# Patient Record
Sex: FEMALE | Race: WHITE | HISPANIC OR LATINO | Employment: UNEMPLOYED | URBAN - METROPOLITAN AREA
[De-identification: names, ages, dates, MRNs, and addresses within clinical notes are randomized per-mention and may not be internally consistent; named-entity substitution may affect disease eponyms.]

---

## 2022-05-27 ENCOUNTER — OFFICE VISIT (OUTPATIENT)
Dept: OBSTETRICS AND GYNECOLOGY | Facility: CLINIC | Age: 22
End: 2022-05-27
Payer: MEDICAID

## 2022-05-27 VITALS
WEIGHT: 114 LBS | BODY MASS INDEX: 21.52 KG/M2 | SYSTOLIC BLOOD PRESSURE: 122 MMHG | OXYGEN SATURATION: 100 % | HEIGHT: 61 IN | RESPIRATION RATE: 18 BRPM | DIASTOLIC BLOOD PRESSURE: 68 MMHG | HEART RATE: 91 BPM

## 2022-05-27 DIAGNOSIS — N91.2 AMENORRHEA: ICD-10-CM

## 2022-05-27 DIAGNOSIS — O21.9 NAUSEA AND VOMITING DURING PREGNANCY: ICD-10-CM

## 2022-05-27 DIAGNOSIS — Z32.01 POSITIVE PREGNANCY TEST: Primary | ICD-10-CM

## 2022-05-27 LAB
B-HCG UR QL: POSITIVE
CTP QC/QA: YES

## 2022-05-27 PROCEDURE — 99203 OFFICE O/P NEW LOW 30 MIN: CPT | Mod: 25,S$GLB,, | Performed by: ADVANCED PRACTICE MIDWIFE

## 2022-05-27 PROCEDURE — 81025 URINE PREGNANCY TEST: CPT | Mod: S$GLB,,, | Performed by: ADVANCED PRACTICE MIDWIFE

## 2022-05-27 PROCEDURE — 99203 PR OFFICE/OUTPT VISIT, NEW, LEVL III, 30-44 MIN: ICD-10-PCS | Mod: 25,S$GLB,, | Performed by: ADVANCED PRACTICE MIDWIFE

## 2022-05-27 PROCEDURE — 81025 POCT URINE PREGNANCY: ICD-10-PCS | Mod: S$GLB,,, | Performed by: ADVANCED PRACTICE MIDWIFE

## 2022-05-27 RX ORDER — ONDANSETRON 4 MG/1
4 TABLET, FILM COATED ORAL EVERY 6 HOURS PRN
Qty: 30 TABLET | Refills: 1 | Status: SHIPPED | OUTPATIENT
Start: 2022-05-27

## 2022-05-27 RX ORDER — PROMETHAZINE HYDROCHLORIDE 25 MG/1
25 TABLET ORAL EVERY 8 HOURS PRN
Qty: 30 TABLET | Refills: 1 | Status: SHIPPED | OUTPATIENT
Start: 2022-05-27

## 2022-05-27 NOTE — PROGRESS NOTES
"CC: Absence of menses    iSlas Kan is a 21 y.o. female  presents with complaint of absence of menstruation.   LMP = very unsure LMP possibly 2022= EDC of 22. ? 10 weeks pregnant.   UPT is positive. No lof/brvb, dysuria, fever/chills or abdominal pain. Moderate N&V not controlled with comfort measures. Pleasant, well groomed, NAD. ROS negative with exception of aforementioned:    History  OBHX:    N&V  PMXH:  Denies major illness/injury  NKDA  MEDS:  OTC PNV  SURGHX:  Denies  SOCHX:  Former smoker, quit > 1 year ago.  Denies etoh or substance use in pregnancy.  FOB: Benjy Sinha involved in pregnancy.    History reviewed. No pertinent past medical history.  History reviewed. No pertinent surgical history.  Social History     Socioeconomic History    Marital status: Single   Tobacco Use    Smoking status: Never Smoker    Smokeless tobacco: Never Used   Substance and Sexual Activity    Alcohol use: Not Currently    Drug use: Never    Sexual activity: Yes     History reviewed. No pertinent family history.  OB History    Para Term  AB Living   1             SAB IAB Ectopic Multiple Live Births                  # Outcome Date GA Lbr Luis Fernando/2nd Weight Sex Delivery Anes PTL Lv   1 Current                /68   Pulse 91   Resp 18   Ht 5' 1" (1.549 m)   Wt 51.7 kg (114 lb)   SpO2 100%   BMI 21.54 kg/m²     ROS:  GENERAL: Denies weight gain or weight loss. Feeling well overall.   SKIN: Denies rash or lesions.   HEAD: Denies head injury or headache.   NODES: Denies enlarged lymph nodes.   CHEST: Denies chest pain or shortness of breath.   CARDIOVASCULAR: Denies palpitations or left sided chest pain.   ABDOMEN: No abdominal pain, constipation, diarrhea, or rectal bleeding. Moderate N&V  URINARY: No frequency, dysuria, hematuria, or burning on urination.  REPRODUCTIVE: See HPI.   BREASTS: The patient performs breast self-examination and denies pain, lumps, or nipple " discharge.   HEMATOLOGIC: No easy bruisability or excessive bleeding.   MUSCULOSKELETAL: Denies joint pain or swelling.   NEUROLOGIC: Denies syncope or weakness.   PSYCHIATRIC: Denies depression, anxiety or mood swings.    PE:   APPEARANCE: Well nourished, well groomed, well developed, in no acute distress.  AFFECT: WNL, alert and oriented x 3.  SKIN: No acne or hirsutism. No lesion/rashes.  NEURO: Gait is smooth and steady.  EXTREMITIES: No edema.    ASSESSMENT and PLAN:    ICD-10-CM ICD-9-CM    1. Positive pregnancy test  Z32.01 V72.42 US OB/GYN Procedure ([facility])   2. Nausea and vomiting during pregnancy  O21.9 643.90    3. Amenorrhea  N91.2 626.0 POCT urine pregnancy       [place] to our collaborative ob/gyn practice and CNM care.  S&S of SAB reinforced.  Patient was counseled today on proper weight gain.  Discussed foods to avoid in pregnancy (i.e. sushi, fish that are high in mercury, deli meat, and unpasteurized cheeses).   RX for Zofran and Phenergan discussed and sent to pharmacy. Additional comfort measures discussed.  US for dating discussed and ordered for next week.  CHEMO 2 weeks with me. Likely new ob labs at next visit.

## 2022-06-03 ENCOUNTER — HOSPITAL ENCOUNTER (OUTPATIENT)
Dept: MATERNAL FETAL MEDICINE | Facility: CLINIC | Age: 22
Discharge: HOME | End: 2022-06-03
Payer: MEDICAID

## 2022-06-03 DIAGNOSIS — Z32.01 POSITIVE PREGNANCY TEST: ICD-10-CM

## 2022-06-03 PROCEDURE — 76801 US OB/GYN PROCEDURE (VIEWPOINT): ICD-10-PCS | Mod: S$GLB,,, | Performed by: OBSTETRICS & GYNECOLOGY

## 2022-06-03 PROCEDURE — 76801 OB US < 14 WKS SINGLE FETUS: CPT | Mod: S$GLB,,, | Performed by: OBSTETRICS & GYNECOLOGY

## 2022-06-10 ENCOUNTER — LAB VISIT (OUTPATIENT)
Dept: LAB | Facility: CLINIC | Age: 22
End: 2022-06-10
Payer: MEDICAID

## 2022-06-10 DIAGNOSIS — Z3A.09 9 WEEKS GESTATION OF PREGNANCY: ICD-10-CM

## 2022-06-10 LAB
ABO + RH BLD: NORMAL
BASOPHILS # BLD AUTO: 0.02 K/UL (ref 0–0.2)
BASOPHILS NFR BLD: 0.3 % (ref 0–1.9)
BLD GP AB SCN CELLS X3 SERPL QL: NORMAL
DIFFERENTIAL METHOD: NORMAL
EOSINOPHIL # BLD AUTO: 0 K/UL (ref 0–0.5)
EOSINOPHIL NFR BLD: 0.5 % (ref 0–8)
ERYTHROCYTE [DISTWIDTH] IN BLOOD BY AUTOMATED COUNT: 12.5 % (ref 11.5–14.5)
HCT VFR BLD AUTO: 38.2 % (ref 37–48.5)
HGB BLD-MCNC: 13.4 G/DL (ref 12–16)
IMM GRANULOCYTES # BLD AUTO: 0.02 K/UL (ref 0–0.04)
IMM GRANULOCYTES NFR BLD AUTO: 0.3 % (ref 0–0.5)
LYMPHOCYTES # BLD AUTO: 1.8 K/UL (ref 1–4.8)
LYMPHOCYTES NFR BLD: 23.6 % (ref 18–48)
MCH RBC QN AUTO: 29.7 PG (ref 27–31)
MCHC RBC AUTO-ENTMCNC: 35.1 G/DL (ref 32–36)
MCV RBC AUTO: 85 FL (ref 82–98)
MONOCYTES # BLD AUTO: 0.3 K/UL (ref 0.3–1)
MONOCYTES NFR BLD: 4.3 % (ref 4–15)
NEUTROPHILS # BLD AUTO: 5.4 K/UL (ref 1.8–7.7)
NEUTROPHILS NFR BLD: 71 % (ref 38–73)
NRBC BLD-RTO: 0 /100 WBC
PLATELET # BLD AUTO: 225 K/UL (ref 150–450)
PMV BLD AUTO: 10 FL (ref 9.2–12.9)
RBC # BLD AUTO: 4.51 M/UL (ref 4–5.4)
WBC # BLD AUTO: 7.6 K/UL (ref 3.9–12.7)

## 2022-06-10 PROCEDURE — 36415 COLL VENOUS BLD VENIPUNCTURE: CPT | Mod: ,,, | Performed by: ADVANCED PRACTICE MIDWIFE

## 2022-06-10 PROCEDURE — 86901 BLOOD TYPING SEROLOGIC RH(D): CPT | Performed by: ADVANCED PRACTICE MIDWIFE

## 2022-06-10 PROCEDURE — 86762 RUBELLA ANTIBODY: CPT | Performed by: ADVANCED PRACTICE MIDWIFE

## 2022-06-10 PROCEDURE — 86592 SYPHILIS TEST NON-TREP QUAL: CPT | Performed by: ADVANCED PRACTICE MIDWIFE

## 2022-06-10 PROCEDURE — 87389 HIV-1 AG W/HIV-1&-2 AB AG IA: CPT | Performed by: ADVANCED PRACTICE MIDWIFE

## 2022-06-10 PROCEDURE — 36415 PR COLLECTION VENOUS BLOOD,VENIPUNCTURE: ICD-10-PCS | Mod: ,,, | Performed by: ADVANCED PRACTICE MIDWIFE

## 2022-06-10 PROCEDURE — 85025 COMPLETE CBC W/AUTO DIFF WBC: CPT | Performed by: ADVANCED PRACTICE MIDWIFE

## 2022-06-10 PROCEDURE — 85660 RBC SICKLE CELL TEST: CPT | Performed by: ADVANCED PRACTICE MIDWIFE

## 2022-06-10 PROCEDURE — 80074 ACUTE HEPATITIS PANEL: CPT | Performed by: ADVANCED PRACTICE MIDWIFE

## 2022-06-11 LAB
HGB S BLD QL SOLY: NEGATIVE
RPR SER QL: NORMAL

## 2022-06-13 LAB
HAV IGM SERPL QL IA: NEGATIVE
HBV CORE IGM SERPL QL IA: NEGATIVE
HBV SURFACE AG SERPL QL IA: NEGATIVE
HCV AB SERPL QL IA: NEGATIVE
HIV 1+2 AB+HIV1 P24 AG SERPL QL IA: NEGATIVE
RUBV IGG SER-ACNC: 11.8 IU/ML
RUBV IGG SER-IMP: REACTIVE

## 2022-06-23 ENCOUNTER — TELEPHONE (OUTPATIENT)
Dept: OBSTETRICS AND GYNECOLOGY | Facility: CLINIC | Age: 22
End: 2022-06-23
Payer: MEDICAID

## 2022-06-23 NOTE — TELEPHONE ENCOUNTER
Attempted to reach pt regarding [facility] results. No answer, LMTRC. Placed in Qing Gaitan's scan basket today.

## 2022-06-30 ENCOUNTER — ROUTINE PRENATAL (OUTPATIENT)
Dept: OBSTETRICS AND GYNECOLOGY | Facility: CLINIC | Age: 22
End: 2022-06-30
Payer: MEDICAID

## 2022-06-30 VITALS — DIASTOLIC BLOOD PRESSURE: 59 MMHG | BODY MASS INDEX: 20.78 KG/M2 | WEIGHT: 110 LBS | SYSTOLIC BLOOD PRESSURE: 111 MMHG

## 2022-06-30 DIAGNOSIS — Z3A.12 12 WEEKS GESTATION OF PREGNANCY: Primary | ICD-10-CM

## 2022-06-30 DIAGNOSIS — O21.9 NAUSEA AND VOMITING DURING PREGNANCY: ICD-10-CM

## 2022-06-30 PROCEDURE — 0502F SUBSEQUENT PRENATAL CARE: CPT | Mod: S$GLB,,, | Performed by: NURSE PRACTITIONER

## 2022-06-30 PROCEDURE — 0502F PR SUBSEQUENT PRENATAL CARE: ICD-10-PCS | Mod: S$GLB,,, | Performed by: NURSE PRACTITIONER

## 2022-06-30 NOTE — PROGRESS NOTES
2022  21 y.o. 12w2d Estimated Date of Delivery: 1/10/23, dating reviewed.   OB History    Para Term  AB Living   1             SAB IAB Ectopic Multiple Live Births                  # Outcome Date GA Lbr Luis Fernando/2nd Weight Sex Delivery Anes PTL Lv   1 Current              The patient presents with complaints of   Chief Complaint   Patient presents with    Routine Prenatal Visit     Pt is her for her Routine OB Visit.Pt has no complaints.       Reports: Good fetal movements reported. No Bleeding or contractions .  She is taking prenatal vitamins. Ms. Kan   is adjusting well and has a good support system of family and friends. She is coping with pregnancy and having no difficulty with sleep. Reports increased vaginal discharge that is yellow to green in color, along with vaginal irritation and itiching.          Review of Systems:  General ROS: negative for headache or visual changes  Breast ROS: negative for breast lumps  Gastrointestinal ROS: negative for constipation, diarrhea or nausea/vomiting  Musculoskeletal ROS: negative for pain in joints or swelling in face or hands.   Neurological ROS: negative for - headaches, numbness/tingling or visual changes      Physical Exam:  BP (!) 111/59   Wt 49.9 kg (110 lb)   BMI 20.78 kg/m²   Urine Dip:  Protein negative Glucose negative    Constitutional: She is oriented to person, place, and time. She appears well-developed and well-nourished. No distress.     Pulmonary/Chest: Effort normal. No respiratory distress  Abdominal: Soft, gravid, nontender. No rebound and no guarding.   Genitourinary: Deferred   Musculoskeletal: Normal range of motion, Minimal peripheral edema.   Neurological: She is alert and oriented to person, place, and time. Coordination normal.   Skin: Skin is warm and dry. She is not diaphoretic.  Psychiatric: She has a normal mood and affect.        Assessment:  Overall doing well.   21 y.o., at 12w2d Gestation   Patient Active Problem  List   Diagnosis    Positive pregnancy test    Nausea and vomiting during pregnancy    9 weeks gestation of pregnancy     Current Outpatient Medications on File Prior to Visit   Medication Sig Dispense Refill    ondansetron (ZOFRAN) 4 MG tablet Take 1 tablet (4 mg total) by mouth every 6 (six) hours as needed for Nausea. 30 tablet 1    promethazine (PHENERGAN) 25 MG tablet Take 1 tablet (25 mg total) by mouth every 8 (eight) hours as needed for Nausea. 30 tablet 1     No current facility-administered medications on file prior to visit.       12 weeks gestation of pregnancy    Nausea and vomiting during pregnancy         Plan:  Overall doing well    Follow up 3 Weeks, bleeding/pain precautions, kick counts, labor precautions discussed. Encouraged to start OTC pepcid 20 mg twice daily.  May use OTC monistat 3 or 7 day treatment as well.  CHEMO in 3 weeks with maddi.     Armida Everett, FNP-C

## 2022-07-05 ENCOUNTER — PATIENT MESSAGE (OUTPATIENT)
Dept: ADMINISTRATIVE | Facility: OTHER | Age: 22
End: 2022-07-05
Payer: MEDICAID

## 2022-07-18 ENCOUNTER — ROUTINE PRENATAL (OUTPATIENT)
Dept: OBSTETRICS AND GYNECOLOGY | Facility: CLINIC | Age: 22
End: 2022-07-18
Payer: MEDICAID

## 2022-07-18 VITALS
SYSTOLIC BLOOD PRESSURE: 118 MMHG | HEART RATE: 94 BPM | WEIGHT: 116.31 LBS | DIASTOLIC BLOOD PRESSURE: 64 MMHG | BODY MASS INDEX: 21.97 KG/M2

## 2022-07-18 DIAGNOSIS — Z3A.14 14 WEEKS GESTATION OF PREGNANCY: Primary | ICD-10-CM

## 2022-07-18 DIAGNOSIS — O21.9 NAUSEA AND VOMITING DURING PREGNANCY: ICD-10-CM

## 2022-07-18 PROBLEM — Z3A.09 9 WEEKS GESTATION OF PREGNANCY: Status: RESOLVED | Noted: 2022-06-10 | Resolved: 2022-07-18

## 2022-07-18 PROCEDURE — 99213 PR OFFICE/OUTPT VISIT, EST, LEVL III, 20-29 MIN: ICD-10-PCS | Mod: TH,S$GLB,, | Performed by: ADVANCED PRACTICE MIDWIFE

## 2022-07-18 PROCEDURE — 99213 OFFICE O/P EST LOW 20 MIN: CPT | Mod: TH,S$GLB,, | Performed by: ADVANCED PRACTICE MIDWIFE

## 2022-07-18 NOTE — PROGRESS NOTES
2022  21 y.o.  at 14 + 6/7 weeks.  Estimated Date of Delivery: 1/10/23, per US at 8 + 3/7 weeks.  OB History    Para Term  AB Living   1             SAB IAB Ectopic Multiple Live Births                  # Outcome Date GA Lbr Luis Fernando/2nd Weight Sex Delivery Anes PTL Lv   1 Current                Here for scheduled CHEMO visit. Doing well.  No lof/brvb, dysuria, fever/chills, or abdominal pain. N&V tx with po anti-emetics. Calm, pleasant, NAD. ROS negative with exception of aforementioned:    Review of Systems:  General ROS: negative for headache or visual changes  Breast ROS: negative for breast lumps  Gastrointestinal ROS: negative for constipation, diarrhea or nausea/vomiting  Musculoskeletal ROS: negative for pain in joints or swelling in face or hands.   Neurological ROS: negative for - headaches, numbness/tingling or visual changes    History  OBHX:    N&V tx with Zofran and Phenergan  PMXH:  Denies major illness/injury  NKDA  MEDS:  OTC PNV  SURGHX:  Denies  SOCHX:  Former smoker, quit > 1 year ago.  Denies etoh or substance use in pregnancy.  FOB: Benjy Sinha involved in pregnancy.    LABS:  O pos abs neg  HIV neg  Rubella immune  RPR non reactive  HEP A B C neg  Sickle Screen neg  CBC    Neg GC CHL  Urine Cx-no growth  Panorama low risk x 3    Physical Exam:  /64   Pulse 94   Wt 52.8 kg (116 lb 4.8 oz)   BMI 21.97 kg/m²   FHT:  150s    Constitutional: She is oriented to person, place, and time. She appears well-developed and well-nourished. No distress.   Pulmonary/Chest: Effort normal. No respiratory distress  Abdominal: Soft, gravid, nontender. No rebound and no guarding. Fundal Height:   S=D.  Genitourinary: Deferred   Musculoskeletal: Normal range of motion. Minimal peripheral edema.   Neurological: She is alert and oriented to person, place, and time. Coordination normal. Gait smooth and steady  Skin: Skin is warm and dry. She is not  diaphoretic.  Psychiatric: She has a normal mood and affect.    Assessment:   21 y.o., at 14 + 6/7 Gestation   Patient Active Problem List   Diagnosis    Positive pregnancy test    Nausea and vomiting during pregnancy    14 weeks gestation of pregnancy     Current Outpatient Medications on File Prior to Visit   Medication Sig Dispense Refill    ondansetron (ZOFRAN) 4 MG tablet Take 1 tablet (4 mg total) by mouth every 6 (six) hours as needed for Nausea. 30 tablet 1    promethazine (PHENERGAN) 25 MG tablet Take 1 tablet (25 mg total) by mouth every 8 (eight) hours as needed for Nausea. 30 tablet 1     No current facility-administered medications on file prior to visit.       Plan:  1. S&S of SAB reinforced.  2. Continue home meds/daily PNV and PRN Zofran/Phenergan.  3. EDC change following US discussed previously.  4. New OB labs reviewed and discussed along with Panorama low risk x 3.  5. CHEMO in 3 weeks with AFP.

## 2022-08-10 ENCOUNTER — ROUTINE PRENATAL (OUTPATIENT)
Dept: OBSTETRICS AND GYNECOLOGY | Facility: CLINIC | Age: 22
End: 2022-08-10
Payer: MEDICAID

## 2022-08-10 ENCOUNTER — LAB VISIT (OUTPATIENT)
Dept: LAB | Facility: CLINIC | Age: 22
End: 2022-08-10
Payer: MEDICAID

## 2022-08-10 VITALS
HEART RATE: 79 BPM | SYSTOLIC BLOOD PRESSURE: 106 MMHG | BODY MASS INDEX: 22.13 KG/M2 | WEIGHT: 117.13 LBS | DIASTOLIC BLOOD PRESSURE: 68 MMHG

## 2022-08-10 DIAGNOSIS — Z3A.18 18 WEEKS GESTATION OF PREGNANCY: ICD-10-CM

## 2022-08-10 DIAGNOSIS — Z3A.18 18 WEEKS GESTATION OF PREGNANCY: Primary | ICD-10-CM

## 2022-08-10 PROBLEM — Z3A.14 14 WEEKS GESTATION OF PREGNANCY: Status: RESOLVED | Noted: 2022-07-18 | Resolved: 2022-08-10

## 2022-08-10 PROCEDURE — 59425 ANTEPARTUM CARE ONLY: CPT | Mod: TH,S$GLB,, | Performed by: ADVANCED PRACTICE MIDWIFE

## 2022-08-10 PROCEDURE — 59425 PR ANTEPARTUM CARE ONLY, 4-6 VISITS: ICD-10-PCS | Mod: TH,S$GLB,, | Performed by: ADVANCED PRACTICE MIDWIFE

## 2022-08-10 PROCEDURE — 81511 FTL CGEN ABNOR FOUR ANAL: CPT | Performed by: ADVANCED PRACTICE MIDWIFE

## 2022-08-10 NOTE — PROGRESS NOTES
8/10/2022  22 y.o.  at 18 +1/7 weeks.  Estimated Date of Delivery: 1/10/23, per US at 8 + 3/7 weeks.  OB History    Para Term  AB Living   1             SAB IAB Ectopic Multiple Live Births                  # Outcome Date GA Lbr Luis Fernando/2nd Weight Sex Delivery Anes PTL Lv   1 Current                Here for scheduled CHEMO visit. Doing well.  No lof/brvb, dysuria, fever/chills, or abdominal pain. N&V tx with po anti-emetics, doing well. + quickening. Calm, pleasant, NAD. ROS negative with exception of aforementioned:    Review of Systems:  General ROS: negative for headache or visual changes  Breast ROS: negative for breast lumps  Gastrointestinal ROS: negative for constipation, diarrhea or nausea/vomiting  Musculoskeletal ROS: negative for pain in joints or swelling in face or hands.   Neurological ROS: negative for - headaches, numbness/tingling or visual changes    History  OBHX:    N&V tx with Zofran and Phenergan  PMXH:  Denies major illness/injury  NKDA  MEDS:  OTC PNV  SURGHX:  Denies  SOCHX:  Former smoker, quit > 1 year ago.  Denies etoh or substance use in pregnancy.  FOB: Benjy Sinha involved in pregnancy.    LABS:  O pos abs neg  HIV neg  Rubella immune  RPR non reactive  HEP A B C neg  Sickle Screen neg  CBC 13   Neg GC CHL  Urine Cx-no growth  Panorama low risk x 3  AFP pending    Physical Exam:  /68   Pulse 79   Wt 53.1 kg (117 lb 1.6 oz)   BMI 22.13 kg/m²   FHT:  150s    Constitutional: She is oriented to person, place, and time. She appears well-developed and well-nourished. No distress.   Pulmonary/Chest: Effort normal. No respiratory distress  Abdominal: Soft, gravid, nontender. No rebound and no guarding. Fundal Height:   S=D. 1 below U.  Genitourinary: Deferred   Musculoskeletal: Normal range of motion. Minimal peripheral edema.   Neurological: She is alert and oriented to person, place, and time. Coordination normal. Gait smooth and steady  Skin:  Skin is warm and dry. She is not diaphoretic.  Psychiatric: She has a normal mood and affect.    Assessment:   22 y.o., at 18 + 1 Gestation   Patient Active Problem List   Diagnosis    Positive pregnancy test    Nausea and vomiting during pregnancy    18 weeks gestation of pregnancy     Current Outpatient Medications on File Prior to Visit   Medication Sig Dispense Refill    ondansetron (ZOFRAN) 4 MG tablet Take 1 tablet (4 mg total) by mouth every 6 (six) hours as needed for Nausea. (Patient not taking: Reported on 8/10/2022) 30 tablet 1    promethazine (PHENERGAN) 25 MG tablet Take 1 tablet (25 mg total) by mouth every 8 (eight) hours as needed for Nausea. (Patient not taking: Reported on 8/10/2022) 30 tablet 1     No current facility-administered medications on file prior to visit.       Plan:  1. S&S of SAB reinforced.  2. Continue home meds/daily PNV and PRN Zofran/Phenergan.  3. EDC change following US discussed previously.  4. New OB labs reviewed and discussed previously along with Panorama low risk x 3.  5. AFP today.  6. Anatomy US in 2 weeks, discussed and ordered. CHEMO in 3-4 weeks.

## 2022-08-11 PROCEDURE — 36415 COLL VENOUS BLD VENIPUNCTURE: CPT | Mod: ,,, | Performed by: STUDENT IN AN ORGANIZED HEALTH CARE EDUCATION/TRAINING PROGRAM

## 2022-08-11 PROCEDURE — 36415 PR COLLECTION VENOUS BLOOD,VENIPUNCTURE: ICD-10-PCS | Mod: ,,, | Performed by: STUDENT IN AN ORGANIZED HEALTH CARE EDUCATION/TRAINING PROGRAM

## 2022-08-12 ENCOUNTER — TELEPHONE (OUTPATIENT)
Dept: OBSTETRICS AND GYNECOLOGY | Facility: CLINIC | Age: 22
End: 2022-08-12
Payer: MEDICAID

## 2022-08-15 LAB
# FETUSES US: NORMAL
2ND TRIMESTER 4 SCREEN PNL SERPL: NEGATIVE
2ND TRIMESTER 4 SCREEN SERPL-IMP: NORMAL
AFP MOM SERPL: 1.04
AFP SERPL-MCNC: 56.6 NG/ML
AGE AT DELIVERY: 23
B-HCG MOM SERPL: 0.46
B-HCG SERPL-ACNC: 13.8 IU/ML
FET TS 21 RISK FROM MAT AGE: NORMAL
GA (DAYS): 1 D
GA (WEEKS): 18 WK
GA METHOD: NORMAL
IDDM PATIENT QL: NORMAL
INHIBIN A MOM SERPL: 0.68
INHIBIN A SERPL-MCNC: 116 PG/ML
SMOKING STATUS FTND: NO
TS 18 RISK FETUS: NORMAL
TS 21 RISK FETUS: NORMAL
U ESTRIOL MOM SERPL: 0.89
U ESTRIOL SERPL-MCNC: 1.55 NG/ML

## 2022-08-26 ENCOUNTER — PROCEDURE VISIT (OUTPATIENT)
Dept: MATERNAL FETAL MEDICINE | Facility: CLINIC | Age: 22
End: 2022-08-26
Payer: MEDICAID

## 2022-08-26 DIAGNOSIS — Z3A.18 18 WEEKS GESTATION OF PREGNANCY: ICD-10-CM

## 2022-08-30 DIAGNOSIS — Z36.89 ENCOUNTER FOR FETAL ANATOMIC SURVEY: Primary | ICD-10-CM

## 2022-09-01 ENCOUNTER — PROCEDURE VISIT (OUTPATIENT)
Dept: MATERNAL FETAL MEDICINE | Facility: CLINIC | Age: 22
End: 2022-09-01
Payer: MEDICAID

## 2022-09-01 DIAGNOSIS — Z36.89 ENCOUNTER FOR FETAL ANATOMIC SURVEY: ICD-10-CM

## 2022-09-01 PROCEDURE — 76805 OB US >/= 14 WKS SNGL FETUS: CPT | Mod: S$GLB,,, | Performed by: OBSTETRICS & GYNECOLOGY

## 2022-09-01 PROCEDURE — 76805 US OB/GYN PROCEDURE (VIEWPOINT): ICD-10-PCS | Mod: S$GLB,,, | Performed by: OBSTETRICS & GYNECOLOGY

## 2022-09-07 PROBLEM — Z3A.18 18 WEEKS GESTATION OF PREGNANCY: Status: RESOLVED | Noted: 2022-08-10 | Resolved: 2022-09-07

## 2022-09-07 PROBLEM — Z3A.22 22 WEEKS GESTATION OF PREGNANCY: Status: ACTIVE | Noted: 2022-09-07

## 2022-09-07 PROBLEM — O21.9 NAUSEA AND VOMITING DURING PREGNANCY: Status: RESOLVED | Noted: 2022-05-27 | Resolved: 2022-09-07

## 2022-11-08 ENCOUNTER — ROUTINE PRENATAL (OUTPATIENT)
Dept: OBSTETRICS AND GYNECOLOGY | Facility: CLINIC | Age: 22
End: 2022-11-08
Payer: MEDICAID

## 2022-11-08 ENCOUNTER — LAB VISIT (OUTPATIENT)
Dept: LAB | Facility: CLINIC | Age: 22
End: 2022-11-08
Payer: MEDICAID

## 2022-11-08 VITALS
DIASTOLIC BLOOD PRESSURE: 68 MMHG | BODY MASS INDEX: 26.11 KG/M2 | SYSTOLIC BLOOD PRESSURE: 123 MMHG | WEIGHT: 138.19 LBS

## 2022-11-08 DIAGNOSIS — Z3A.31 31 WEEKS GESTATION OF PREGNANCY: ICD-10-CM

## 2022-11-08 DIAGNOSIS — O09.33 INSUFFICIENT PRENATAL CARE IN THIRD TRIMESTER: ICD-10-CM

## 2022-11-08 DIAGNOSIS — Z3A.31 31 WEEKS GESTATION OF PREGNANCY: Primary | ICD-10-CM

## 2022-11-08 PROBLEM — Z3A.22 22 WEEKS GESTATION OF PREGNANCY: Status: RESOLVED | Noted: 2022-09-07 | Resolved: 2022-11-08

## 2022-11-08 PROBLEM — Z32.01 POSITIVE PREGNANCY TEST: Status: RESOLVED | Noted: 2022-05-27 | Resolved: 2022-11-08

## 2022-11-08 PROCEDURE — 59425 PR ANTEPARTUM CARE ONLY, 4-6 VISITS: ICD-10-PCS | Mod: TH,S$GLB,,

## 2022-11-08 PROCEDURE — 85025 COMPLETE CBC W/AUTO DIFF WBC: CPT

## 2022-11-08 PROCEDURE — 59425 ANTEPARTUM CARE ONLY: CPT | Mod: TH,S$GLB,,

## 2022-11-08 NOTE — PROGRESS NOTES
2022  22 y.o.  at 31 +0/7 weeks.  Estimated Date of Delivery: 1/10/23, per US at 8 + 3/7 weeks.  OB History    Para Term  AB Living   1             SAB IAB Ectopic Multiple Live Births                  # Outcome Date GA Lbr Luis Fernando/2nd Weight Sex Delivery Anes PTL Lv   1 Current                Here for scheduled CHEMO visit. Doing well.  No cramps, contractions, lof/brvb, dysuria, fever/chills, or abdominal pain. Endorses +FM. Calm, pleasant, NAD. ROS negative with exception of aforementioned:    Has not been seen for CHEMO since 18 weeks. We reviewed importance for maternal and infant health that patient keeps her scheduled prenatal appointments. Pt verbalizes understanding.     Declines 1 hr GTT. Pt is under 30, normal pre-pregnancy BMI. We reviewed her alternative is to complete 2 weeks of QID finger sticks. Pt is agreeable to fingersticks. BG kit and logs provided. Pt educated she MUST bring logs to her CHEMO in 2 weeks. Pt verbalizes understanding.     CBC collected today.     Review of Systems:  General ROS: negative for headache or visual changes  Breast ROS: negative for breast lumps  Gastrointestinal ROS: negative for constipation, diarrhea or nausea/vomiting  Musculoskeletal ROS: negative for pain in joints or swelling in face or hands.   Neurological ROS: negative for - headaches, numbness/tingling or visual changes    History  OBHX:    N&V tx with Zofran and Phenergan    PMXH:  Denies major illness/injury    NKDA  MEDS:  OTC PNV    SURGHX:  Denies    SOCHX:  Former smoker, quit > 1 year ago.  Denies etoh or substance use in pregnancy.    FOB: Benjy Sinha involved in pregnancy.    LABS:  O pos abs neg  HIV neg  Rubella immune  RPR non reactive  HEP A B C neg  Sickle Screen neg  CBC 13/38   Neg GC CHL  Urine Cx-no growth  Panorama low risk x 3  Quad Neg    Physical Exam:  /68   Wt 62.7 kg (138 lb 3.2 oz)   BMI 26.11 kg/m²   FHT: Fetal Heart Rate:  150s    Constitutional: She is oriented to person, place, and time. She appears well-developed and well-nourished. No distress.   Pulmonary/Chest: Effort normal. No respiratory distress  Abdominal: Soft, gravid, nontender. No rebound and no guarding. Fundal Height: Fundal Height (cm): 32 cm S=D.  Genitourinary: Deferred   Musculoskeletal: Normal range of motion. Minimal peripheral edema.   Neurological: She is alert and oriented to person, place, and time. Coordination normal. Gait smooth and steady  Skin: Skin is warm and dry. She is not diaphoretic.  Psychiatric: She has a normal mood and affect.    Assessment:   22 y.o., at 31 + 0 Gestation   Patient Active Problem List   Diagnosis    Insufficient prenatal care in third trimester    31 weeks gestation of pregnancy     Current Outpatient Medications on File Prior to Visit   Medication Sig Dispense Refill    ondansetron (ZOFRAN) 4 MG tablet Take 1 tablet (4 mg total) by mouth every 6 (six) hours as needed for Nausea. (Patient not taking: Reported on 8/10/2022) 30 tablet 1    promethazine (PHENERGAN) 25 MG tablet Take 1 tablet (25 mg total) by mouth every 8 (eight) hours as needed for Nausea. (Patient not taking: Reported on 8/10/2022) 30 tablet 1     No current facility-administered medications on file prior to visit.       Plan:  S&S of PTL/PPROM reinforced.  Continue home meds/daily PNV and PRN Zofran/Phenergan.  EDC 1/10/2023 per u/s at 8w3d.  New OB labs reviewed and discussed previously along with Panorama low risk x 3.  Quad screen Neg.  Anatomy US WNL.  Pt declines GTT. Will complete 2 weeks of QID finger sticks.  CBC collected today.   CHEMO 2 weeks.

## 2022-11-09 LAB
BASOPHILS # BLD AUTO: 0.03 K/UL (ref 0–0.2)
BASOPHILS NFR BLD: 0.3 % (ref 0–1.9)
DIFFERENTIAL METHOD: ABNORMAL
EOSINOPHIL # BLD AUTO: 0.1 K/UL (ref 0–0.5)
EOSINOPHIL NFR BLD: 0.7 % (ref 0–8)
ERYTHROCYTE [DISTWIDTH] IN BLOOD BY AUTOMATED COUNT: 13.9 % (ref 11.5–14.5)
HCT VFR BLD AUTO: 36.2 % (ref 37–48.5)
HGB BLD-MCNC: 11.6 G/DL (ref 12–16)
IMM GRANULOCYTES # BLD AUTO: 0.09 K/UL (ref 0–0.04)
IMM GRANULOCYTES NFR BLD AUTO: 1 % (ref 0–0.5)
LYMPHOCYTES # BLD AUTO: 2.1 K/UL (ref 1–4.8)
LYMPHOCYTES NFR BLD: 23.9 % (ref 18–48)
MCH RBC QN AUTO: 30 PG (ref 27–31)
MCHC RBC AUTO-ENTMCNC: 32 G/DL (ref 32–36)
MCV RBC AUTO: 94 FL (ref 82–98)
MONOCYTES # BLD AUTO: 0.6 K/UL (ref 0.3–1)
MONOCYTES NFR BLD: 6.4 % (ref 4–15)
NEUTROPHILS # BLD AUTO: 5.9 K/UL (ref 1.8–7.7)
NEUTROPHILS NFR BLD: 67.7 % (ref 38–73)
NRBC BLD-RTO: 0 /100 WBC
PLATELET # BLD AUTO: 161 K/UL (ref 150–450)
PMV BLD AUTO: 10.7 FL (ref 9.2–12.9)
RBC # BLD AUTO: 3.87 M/UL (ref 4–5.4)
WBC # BLD AUTO: 8.73 K/UL (ref 3.9–12.7)

## 2022-11-09 PROCEDURE — 36415 PR COLLECTION VENOUS BLOOD,VENIPUNCTURE: ICD-10-PCS | Mod: ,,, | Performed by: STUDENT IN AN ORGANIZED HEALTH CARE EDUCATION/TRAINING PROGRAM

## 2022-11-09 PROCEDURE — 36415 COLL VENOUS BLD VENIPUNCTURE: CPT | Mod: ,,, | Performed by: STUDENT IN AN ORGANIZED HEALTH CARE EDUCATION/TRAINING PROGRAM

## 2023-10-16 PROBLEM — Z3A.31 31 WEEKS GESTATION OF PREGNANCY: Status: RESOLVED | Noted: 2022-11-08 | Resolved: 2023-10-16
